# Patient Record
Sex: FEMALE | Race: WHITE | NOT HISPANIC OR LATINO | URBAN - METROPOLITAN AREA
[De-identification: names, ages, dates, MRNs, and addresses within clinical notes are randomized per-mention and may not be internally consistent; named-entity substitution may affect disease eponyms.]

---

## 2021-12-09 ENCOUNTER — OUTPATIENT (OUTPATIENT)
Dept: OUTPATIENT SERVICES | Facility: HOSPITAL | Age: 56
LOS: 1 days | End: 2021-12-09
Payer: COMMERCIAL

## 2021-12-09 PROCEDURE — 73502 X-RAY EXAM HIP UNI 2-3 VIEWS: CPT

## 2021-12-09 PROCEDURE — 73502 X-RAY EXAM HIP UNI 2-3 VIEWS: CPT | Mod: 26,LT

## 2022-01-12 ENCOUNTER — APPOINTMENT (OUTPATIENT)
Dept: ORTHOPEDIC SURGERY | Facility: CLINIC | Age: 57
End: 2022-01-12
Payer: COMMERCIAL

## 2022-01-12 VITALS — HEART RATE: 76 BPM | DIASTOLIC BLOOD PRESSURE: 77 MMHG | SYSTOLIC BLOOD PRESSURE: 135 MMHG | OXYGEN SATURATION: 98 %

## 2022-01-12 VITALS — BODY MASS INDEX: 28.58 KG/M2 | HEIGHT: 69 IN | WEIGHT: 193 LBS

## 2022-01-12 DIAGNOSIS — Z72.3 LACK OF PHYSICAL EXERCISE: ICD-10-CM

## 2022-01-12 DIAGNOSIS — Z78.9 OTHER SPECIFIED HEALTH STATUS: ICD-10-CM

## 2022-01-12 DIAGNOSIS — Z82.61 FAMILY HISTORY OF ARTHRITIS: ICD-10-CM

## 2022-01-12 DIAGNOSIS — M54.16 RADICULOPATHY, LUMBAR REGION: ICD-10-CM

## 2022-01-12 DIAGNOSIS — M16.0 BILATERAL PRIMARY OSTEOARTHRITIS OF HIP: ICD-10-CM

## 2022-01-12 PROCEDURE — 72100 X-RAY EXAM L-S SPINE 2/3 VWS: CPT

## 2022-01-12 PROCEDURE — 99204 OFFICE O/P NEW MOD 45 MIN: CPT

## 2022-01-12 PROCEDURE — 73502 X-RAY EXAM HIP UNI 2-3 VIEWS: CPT | Mod: LT

## 2022-01-12 NOTE — CONSULT LETTER
[Dear  ___] : Dear  [unfilled], [Consult Letter:] : I had the pleasure of evaluating your patient, [unfilled]. [Please see my note below.] : Please see my note below. [Consult Closing:] : Thank you very much for allowing me to participate in the care of this patient.  If you have any questions, please do not hesitate to contact me. [Sincerely,] : Sincerely, [FreeTextEntry3] : Anthony Barrios MD\par Orthopaedic Surgery - Adult Reconstruction\par [facility]\par [address] [place] [address]\par p. [phone]\par f. [phone]

## 2022-01-12 NOTE — PHYSICAL EXAM
[de-identified] : General appearance: well nourished and hydrated, pleasant, alert and oriented x 3, cooperative.  \par HEENT: normocephalic, EOM intact, wearing mask, external auditory canal clear.  \par Cardiovascular: no lower leg edema, no varicosities, dorsalis pedis pulses palpable and symmetric.  \par Lymphatics: no palpable lymphadenopathy, no lymphedema.  \par Neurologic: sensation is normal, no muscle weakness in upper or lower extremities, patella tendon reflexes present and symmetric.  \par Dermatologic: skin moist, warm, no rash.  \par Spine: cervical spine with normal lordosis and painless range of motion, thoracic spine with normal kyphosis and painless range of motion, lumbosacral spine with normal lordosis and stiff uncomfortable range of motion.  No tenderness to palpation along midline spine and paraspinal musculature.  Sacroiliac joints nontender bilaterally. Negative SLR and crossed SLR tests bilaterally.\par Gait: slow to stand and get started, requiring bilateral arm push-off. Pronounced limp initially which improves after the first few steps. Waddling gait.\par \par Limb lengths: difficult to assess due to bilateral hip abduction contractures\par \par Left hip:\par - Swelling: none\par - Ecchymosis: none\par - Erythema: none\par - Wounds: none\par - Tenderness: mild anterior\par - ROM: 70 flexion, 0 extension, 5-10 abduction contracture, 35 further abduction, 5-10 degree external rotation contracture, 40 further external rotation\par - HALLIE: painful\par - FADIR: painful\par - Alexander: positive\par - Stinchfield: positive\par - Flexor power: 4+/5\par - Abductor power: 4+/5\par \par Right hip:\par - Swelling: none\par - Ecchymosis: none\par - Erythema: none\par - Wounds: none\par - Tenderness: mild anterior\par - ROM: 90 flexion, 0 extension, 5 abduction contracture, 35 further abduction, 10 internal rotation, 30 external rotation\par - HALLIE: painful\par - FADIR: painful\par - Alexander: positive\par - Stinchfield: positive\par - Flexor power: 4+/5\par - Abductor power: 4+/5 [de-identified] : A lateral view of the lumbosacral spine, weightbearing AP pelvis, and 2 additional views (frog lateral and false profile) of the hip were interpreted by me and reviewed with the patient.\par \par Location of imaging: [facility]\par Date of exam: 1/12/22\par \par Lumbar spine --\par Alignment: normal\par Spondylosis: mild L5/S1\par Listhesis: grade 1 retro L5/S1\par Posterior elements: mild multilevel facet arthrosis\par \par Pelvic alignment: normal\par \par Right hip --\par Alignment: coxa profunda\par Arthritis: moderate diffuse joint space narrowing\par Deformity: none\par Osteonecrosis: none\par \par Left hip --\par Alignment: coxa profunda\par Arthritis: severe mostly inferomedial joint space narrowing\par Deformity: none\par Osteonecrosis: none

## 2022-01-12 NOTE — DISCUSSION/SUMMARY
[de-identified] : 55y/o female with L > R hip osteoarthritis, left lumbar radiculopathy\par - She is indicated for left total hip arthroplasty\par - We discussed the details of the procedure, the expected recovery period, and the expected outcome. We discussed the likelihood of satisfaction after complete recovery, and the potential causes of dissatisfaction. The importance of active patient participation in the rehabilitation protocol was emphasized, along with its influence on short and long-term outcomes. We discussed the risks, benefits, and alternatives of surgery at length. Specific risks of total hip replacement were discussed in detail. We discussed the risk of surgical site complications including but not limited to: surgical site infection, wound healing complications, bone fracture, tendon or ligament injury, neurovascular injury, hemorrhage, postoperative stiffness or instability, persistent pain, limb length discrepancy, and need for reoperation. We discussed surgical blood loss and the possible need for blood transfusion. We discussed the risk of perioperative medical complications, including but not limited to catheter-associated urinary tract infection, venous thromboembolism and other cardiopulmonary complications. We discussed anesthetic options and the risk of anesthesia-related complications. We discussed the potential benefits of surgery including the potential to improve the current clinical condition through operative intervention. I emphasized that there are alternatives to surgical intervention including continued conservative management, though such a course could yield less than optimal results in this particular patient. A model was used to demonstrate the operation and to discuss bearing surfaces of the implants. We discussed implant fixation methods; my plan would be to use fully cementless fixation in this case. We discussed the various surgical approaches to the hip; I think that an anterior approach would be appropriate in this case. We discussed the durability of prosthetic hips and limitations related to wear, osteolysis and loosening. All questions were answered to the patient's satisfaction. The patient was given a copy of my preoperative packet with additional information about the procedure. I asked the patient to either call back or schedule a followup appointment for any additional questions or concerns regarding the procedure.\par - We reviewed that some portion of her LLE symptoms are arising from her lumbar pathology and will not be alleviated by the COLIN. She will follow up with her pain management physician to discuss further treatment for the left sciatica.\par - Book for surgery at a convenient time, ASAP per her preference\par - Standard medical clearance preoperatively

## 2022-01-12 NOTE — HISTORY OF PRESENT ILLNESS
[6] : a current pain level of 6/10 [Constant] : ~He/She~ states the symptoms seem to be constant [de-identified] : 1/12/22: 57y/o female ref by Dr. Sanders for left hip osteoarthritis. Symptoms progressive for 13 years. Localizes pain to the groin with radiation down the quads, as well as at the midline low back with radiation through the buttock to the posterior knee. Treatments attempted thus far have included PT, HEP, Tylenol, various NSAIDs, and a left hip CSI approximately 18 months ago. Still with significant stiffness and daily pain. The right hip is nearly as stiff but not nearly as painful. Has remained active and is still working as a [employer] ; walks about 15-19k steps daily and performs all her daily chores. Has a lot of soreness at the end of the day. Seeking L COLIN as soon as possible.\par \par She denies PMH. She has been seeing a private Pain Mgmt physician for epidural injections for the left sided sciatica; has not had such treatments in over a year. [Walking] : worsened by walking [Acetaminophen] : relieved by acetaminophen [Rest] : relieved by rest [de-identified] : describes burning/achy and sharp pain